# Patient Record
Sex: MALE | Race: WHITE
[De-identification: names, ages, dates, MRNs, and addresses within clinical notes are randomized per-mention and may not be internally consistent; named-entity substitution may affect disease eponyms.]

---

## 2021-04-07 ENCOUNTER — HOSPITAL ENCOUNTER (EMERGENCY)
Dept: HOSPITAL 8 - ED | Age: 51
Discharge: HOME | End: 2021-04-07
Payer: SELF-PAY

## 2021-04-07 VITALS — SYSTOLIC BLOOD PRESSURE: 146 MMHG | DIASTOLIC BLOOD PRESSURE: 90 MMHG

## 2021-04-07 VITALS — BODY MASS INDEX: 24 KG/M2 | WEIGHT: 162.04 LBS | HEIGHT: 69 IN

## 2021-04-07 DIAGNOSIS — F23: Primary | ICD-10-CM

## 2021-04-07 DIAGNOSIS — R00.0: ICD-10-CM

## 2021-04-07 DIAGNOSIS — F17.210: ICD-10-CM

## 2021-04-07 DIAGNOSIS — F22: ICD-10-CM

## 2021-04-07 PROCEDURE — 99406 BEHAV CHNG SMOKING 3-10 MIN: CPT

## 2021-04-07 PROCEDURE — 99284 EMERGENCY DEPT VISIT MOD MDM: CPT

## 2021-04-07 NOTE — NUR
pt refused d/c instructions. denies psych help. pt states he has safe place to 
go to with dad, and he lives close to hospital. pt declined taxi voucher. pt 
steady ambulating. vss

## 2021-04-29 ENCOUNTER — HOSPITAL ENCOUNTER (EMERGENCY)
Facility: MEDICAL CENTER | Age: 51
End: 2021-04-29
Attending: EMERGENCY MEDICINE

## 2021-04-29 ENCOUNTER — HOSPITAL ENCOUNTER (EMERGENCY)
Dept: HOSPITAL 8 - ED | Age: 51
Discharge: HOME | End: 2021-04-29
Payer: SELF-PAY

## 2021-04-29 VITALS — WEIGHT: 162.04 LBS | BODY MASS INDEX: 24 KG/M2 | HEIGHT: 69 IN

## 2021-04-29 VITALS — DIASTOLIC BLOOD PRESSURE: 100 MMHG | SYSTOLIC BLOOD PRESSURE: 176 MMHG

## 2021-04-29 VITALS
HEIGHT: 69 IN | OXYGEN SATURATION: 98 % | DIASTOLIC BLOOD PRESSURE: 131 MMHG | BODY MASS INDEX: 22.53 KG/M2 | SYSTOLIC BLOOD PRESSURE: 170 MMHG | HEART RATE: 109 BPM | RESPIRATION RATE: 18 BRPM | TEMPERATURE: 98.5 F | WEIGHT: 152.12 LBS

## 2021-04-29 DIAGNOSIS — J34.0: ICD-10-CM

## 2021-04-29 DIAGNOSIS — F23: Primary | ICD-10-CM

## 2021-04-29 DIAGNOSIS — F17.210: ICD-10-CM

## 2021-04-29 DIAGNOSIS — F22 DELUSIONS OF PARASITOSIS (HCC): ICD-10-CM

## 2021-04-29 PROCEDURE — 99283 EMERGENCY DEPT VISIT LOW MDM: CPT

## 2021-04-29 NOTE — ED TRIAGE NOTES
"Yazan Harman  51 y.o. male  Chief Complaint   Patient presents with   • Psych Eval     \"Microorganisms are inside me, coming out of my nose and imbedding into my skin\". Denies psych history, denies substance use.       Vitals:    04/29/21 0210   BP: (!) 170/131   Pulse: (!) 109   Resp: 18   Temp: 36.9 °C (98.5 °F)   SpO2: 98%        "

## 2021-04-29 NOTE — ED PROVIDER NOTES
"ED Provider Note    Scribed for Moustapha Adams M.D. by Giuseppe Marquez. 4/29/2021  4:33 AM    Primary care provider: No primary care provider noted.  Means of arrival: Walk-in  History obtained from: Patient  History limited by: None    CHIEF COMPLAINT  Chief Complaint   Patient presents with    Psych Eval     \"Microorganisms are inside me, coming out of my nose and imbedding into my skin\". \"Pt bought a UV light that kills them but they keep coming back\". Denies psych history, denies substance use.        HPI  Yazan Harman is a 51 y.o. male who presents to the Emergency Department complaining of \"microorganisms coming out of my nose and going into my skin.\" Patient states he \"already did all the research and knows that the fuck it is... I've been treating myself with the UV light and I'm going to goldie them.\" He demanding to be treated with the \"UV light, you know what I'm talking about. Give me the light or I'm fucking leaving.\" He denies any drug usage or psychiatric history.     REVIEW OF SYSTEMS  Pertinent positives include delusions.   Pertinent negatives include no drug usage or psychiatric history.    See HPI for further details.       PAST MEDICAL HISTORY       SURGICAL HISTORY  patient denies any surgical history    SOCIAL HISTORY  Social History     Tobacco Use    Smoking status: Current Every Day Smoker    Smokeless tobacco: Never Used   Substance Use Topics    Alcohol use: Not Currently    Drug use: Not Currently      Social History     Substance and Sexual Activity   Drug Use Not Currently       FAMILY HISTORY  History reviewed. No pertinent family history.    CURRENT MEDICATIONS  Home Medications       Reviewed by Thu Lopez R.N. (Registered Nurse) on 04/29/21 at 0236  Med List Status: Complete     Medication Last Dose Status        Patient Reese Taking any Medications                           ALLERGIES  Allergies   Allergen Reactions    Naproxen Swelling       PHYSICAL " "EXAM  VITAL SIGNS: BP (!) 170/131   Pulse (!) 109   Temp 36.9 °C (98.5 °F) (Temporal)   Resp 18   Ht 1.753 m (5' 9\")   Wt 69 kg (152 lb 1.9 oz)   SpO2 98%   BMI 22.46 kg/m²   Nursing note and vitals reviewed.  Constitutional: Well-developed and well-nourished. Mild distress.   HENT: Head is normocephalic and atraumatic. Oropharynx is clear and moist without exudate or erythema.   Eyes: Pupils are equal, round, and reactive to light. Conjunctiva are normal.   Cardiovascular: Tachycardic and regular rhythm. No murmur heard. Normal radial pulses.  Pulmonary/Chest: Breath sounds normal. No wheezes or rales.   Abdominal: Soft and non-tender. No distention    Musculoskeletal: Extremities exhibit normal range of motion without edema or tenderness.   Neurological: Awake, alert and oriented to person, place, and time. No focal deficits noted.  Skin: Skin is warm and dry. No rash.   Psychiatric: Patient has delusions of parasitosis pointing to his skin and using tweezers and requesting that I use a UV light to treat him.       COURSE & MEDICAL DECISION MAKING  Nursing notes, VS, PMSFHx reviewed in chart.     Review of past medical records shows the patient has no prior records on file.      4:33 AM - Patient seen and examined at bedside. Patient is tachycardic and hypertensive with clear delusions or parasitosis. Suspected methamphetamine intoxication. I do not believe he is a danger to himself or others and does not meet legal 2K criteria.     The patient will return for new or worsening symptoms and is stable at the time of discharge.    The patient is referred to a primary physician for blood pressure management, diabetic screening, and for all other preventative health concerns.    DISPOSITION:  Patient will be discharged home in stable condition.    FOLLOW UP:  [facility], Emergency Dept  [address] [address] [phone]    If symptoms worsen      OUTPATIENT " MEDICATIONS:  New Prescriptions    No medications on file           FINAL IMPRESSION  1. Delusions of parasitosis (HCC)          IGiuseppe (Scribe), am scribing for, and in the presence of, Moustapha Adams M.D..    Electronically signed by: Giuseppe Marquez (Scribe), 4/29/2021    Moustapha ROMAN M.D. personally performed the services described in this documentation, as scribed by Giuseppe Marquez in my presence, and it is both accurate and complete.    The note accurately reflects work and decisions made by me.  Moustapha Adams M.D.  4/29/2021  11:21 AM